# Patient Record
Sex: MALE | Race: WHITE | NOT HISPANIC OR LATINO | Employment: FULL TIME | URBAN - METROPOLITAN AREA
[De-identification: names, ages, dates, MRNs, and addresses within clinical notes are randomized per-mention and may not be internally consistent; named-entity substitution may affect disease eponyms.]

---

## 2018-12-07 ENCOUNTER — TELEPHONE (OUTPATIENT)
Dept: NEUROSURGERY | Facility: CLINIC | Age: 58
End: 2018-12-07

## 2018-12-07 ENCOUNTER — OFFICE VISIT (OUTPATIENT)
Dept: NEUROSURGERY | Facility: CLINIC | Age: 58
End: 2018-12-07

## 2018-12-07 DIAGNOSIS — M51.26 HNP (HERNIATED NUCLEUS PULPOSUS), LUMBAR: Primary | ICD-10-CM

## 2018-12-07 DIAGNOSIS — M54.32 SCIATICA OF LEFT SIDE: ICD-10-CM

## 2018-12-07 PROCEDURE — 99202 OFFICE O/P NEW SF 15 MIN: CPT | Performed by: NEUROLOGICAL SURGERY

## 2018-12-07 RX ORDER — METHYLPREDNISOLONE 4 MG/1
TABLET ORAL
Qty: 21 TABLET | Refills: 0 | Status: SHIPPED | OUTPATIENT
Start: 2018-12-07 | End: 2019-01-22

## 2018-12-07 NOTE — TELEPHONE ENCOUNTER
Provider: Rashard   Caller: self  Time of call:     Phone #:  [phone]  Surgery:  n/a  Surgery Date:    Last visit:   12/7/18, today in [place]  Next visit:     MAEVE:         Reason for call:       Patient states that Dr. Wetzel was going to send a steroid pack to his pharmacy and it hasn't been sent.    Please advise

## 2018-12-10 VITALS
WEIGHT: 245 LBS | HEIGHT: 73 IN | BODY MASS INDEX: 32.47 KG/M2 | TEMPERATURE: 94.7 F | OXYGEN SATURATION: 99 % | RESPIRATION RATE: 17 BRPM

## 2018-12-10 RX ORDER — OXYCODONE AND ACETAMINOPHEN 7.5; 325 MG/1; MG/1
1 TABLET ORAL EVERY 6 HOURS PRN
COMMUNITY
End: 2019-01-22

## 2018-12-10 RX ORDER — HYDROCODONE BITARTRATE AND ACETAMINOPHEN 7.5; 325 MG/1; MG/1
1 TABLET ORAL EVERY 6 HOURS PRN
COMMUNITY

## 2018-12-10 RX ORDER — TIZANIDINE 2 MG/1
2 TABLET ORAL NIGHTLY PRN
COMMUNITY

## 2018-12-10 NOTE — PROGRESS NOTES
Chief Complaint:  Low back pain  Left leg pain        PATIENT: Abundio Crooks  :  1960      HISTORY OF PRESENT ILLNESS: I saw Abundio Crooks in neurosurgical consultation. He is a 57-year-old gentleman who presents with a history of back, buttock and anterior thigh pain. The pain has been present for about 3-4 weeks. It is relatively significant and it is associated with tingling and burning. He denies any bowel or bladder incontinence. He has a history of some chronic back aches, but nothing significant. He has no evidence of right leg pain. He denies any cauda equina syndrome. He has not been through physical therapy.    PAST MEDICAL HISTORY: Reviewed and documented in the chart. He is a heavy smoker. He has a history of obesity.     MEDICATIONS: He is on some Percocet     PHYSICAL EXAMINATION: He is awake and alert and follows commands. He does not appear in too much pain. He has decreased range of motion. He is walking with assistance of a cane. He has no signs of intrinsic hip dysfunction. His back is without lesions. He has no straight leg raise sign. He has good strength with trace of reflexes. He has no clonus long tract signs of myelopathy.     I reviewed an MRI. Interestingly, he has a LARGE right-sided L5-S1 disc, but I think the culprit lesion is the left small 2-3 disc herniation.    IMPRESSION/PLAN: My impression is that he presents with relatively early onset of left-sided anterior thigh sciatica. It is from an L2-3 disc herniation. I think it is small. He has not been through physical therapy. I would recommend some steroids, a course of conservative care including physical therapy and a followup in 2019 to track his progress. I explained the risks, benefits and expected outcome of the surgery. I think it is a little bit premature.        Ravi Wetzel MD  CNR/[facility]

## 2018-12-27 ENCOUNTER — TELEPHONE (OUTPATIENT)
Dept: NEUROSURGERY | Facility: CLINIC | Age: 58
End: 2018-12-27

## 2018-12-27 NOTE — TELEPHONE ENCOUNTER
Patient needs to do formal PT prior to consideration for surgery.  No reason for see him back until he tries PT unless he develops signs and symptoms of cauda equina

## 2018-12-27 NOTE — TELEPHONE ENCOUNTER
Provider: Rashard   Caller: female, unkown  Time of call: 359    Phone #: [phone]   Last visit:  12/7/18   Next visit: January, not scheduled yet    Reason for call:         Female called on behalf of pt stating that the pt was trying to schedule a f/u with Dr. Wetzel however the person she spoke with to schedule this stated that he has to have done PT before scheduling this, which he has not done. He was under the impression the PT was only for if he had surgery, which he didn't. His off-work status expires today, they want to know if he is to return to work tomorrow, does he need to perform PT before scheduling f/u or can he proceed with scheduling a f/u visit? She mentioned he did not want to interfere with his FMLA/short-term disability claims but I informed her that that subject is outside my area of knowledge (or concern).

## 2018-12-28 NOTE — TELEPHONE ENCOUNTER
Patient was under the impression that he did not need PT unless he was having surgery. I told him that was correct but Dr. Wetzel wants him to have PT prior to his f/u visit.  I have faxed a PT order to [facility] at [phone]

## 2019-01-18 ENCOUNTER — OFFICE VISIT (OUTPATIENT)
Dept: NEUROSURGERY | Facility: CLINIC | Age: 59
End: 2019-01-18

## 2019-01-18 DIAGNOSIS — M54.32 SCIATICA OF LEFT SIDE: ICD-10-CM

## 2019-01-18 DIAGNOSIS — M51.26 HNP (HERNIATED NUCLEUS PULPOSUS), LUMBAR: Primary | ICD-10-CM

## 2019-01-18 PROCEDURE — 99212 OFFICE O/P EST SF 10 MIN: CPT | Performed by: NEUROLOGICAL SURGERY

## 2019-01-22 VITALS — HEIGHT: 73 IN | TEMPERATURE: 98.8 F | RESPIRATION RATE: 17 BRPM | WEIGHT: 250 LBS | BODY MASS INDEX: 33.13 KG/M2

## 2019-01-22 NOTE — PROGRESS NOTES
"Chief Complaint:  \"Left lower back pain\"          2019  PATIENT: Abundio Crooks  :  1960      HISTORY OF PRESENT ILLNESS: I saw Abundio Crooks in neurosurgical consultation. He is a gentleman well-known to me for a history of some left thigh tingling issues who had a ruptured L5-S1 disc. It was mostly eccentric to the right. He had a lot of back pain. He denied bowel or bladder problems.    He is better with physical therapy. His exam today is good. He is ambulating well. He has good strength. I detected no weakness.     My plan will be for monitoring his neurologic exam. We will make a referral to interventional pain specialist if he continues to have discomfort in his back.        Ravi Wetzel MD  "

## 2021-04-16 ENCOUNTER — OFFICE VISIT (OUTPATIENT)
Dept: CARDIOLOGY | Facility: CLINIC | Age: 61
End: 2021-04-16

## 2021-04-16 VITALS
SYSTOLIC BLOOD PRESSURE: 136 MMHG | RESPIRATION RATE: 12 BRPM | OXYGEN SATURATION: 99 % | BODY MASS INDEX: 32.18 KG/M2 | HEART RATE: 84 BPM | TEMPERATURE: 97.8 F | HEIGHT: 72 IN | DIASTOLIC BLOOD PRESSURE: 88 MMHG | WEIGHT: 237.6 LBS

## 2021-04-16 DIAGNOSIS — Z72.0 TOBACCO USE: ICD-10-CM

## 2021-04-16 DIAGNOSIS — I10 ESSENTIAL HYPERTENSION: Primary | ICD-10-CM

## 2021-04-16 DIAGNOSIS — I25.10 MILD CAD: ICD-10-CM

## 2021-04-16 DIAGNOSIS — E78.5 HYPERLIPIDEMIA LDL GOAL <70: ICD-10-CM

## 2021-04-16 PROBLEM — E11.9 TYPE 2 DIABETES MELLITUS, WITHOUT LONG-TERM CURRENT USE OF INSULIN (HCC): Status: ACTIVE | Noted: 2021-04-16

## 2021-04-16 PROCEDURE — 93000 ELECTROCARDIOGRAM COMPLETE: CPT | Performed by: INTERNAL MEDICINE

## 2021-04-16 PROCEDURE — 99214 OFFICE O/P EST MOD 30 MIN: CPT | Performed by: INTERNAL MEDICINE

## 2021-04-16 RX ORDER — PRAVASTATIN SODIUM 10 MG
10 TABLET ORAL DAILY
COMMUNITY
Start: 2021-02-10

## 2021-04-16 RX ORDER — TIOTROPIUM BROMIDE INHALATION SPRAY 3.12 UG/1
SPRAY, METERED RESPIRATORY (INHALATION)
COMMUNITY
Start: 2021-02-19

## 2021-04-16 RX ORDER — FENOFIBRATE 160 MG/1
TABLET ORAL
COMMUNITY
Start: 2021-02-16 | End: 2023-01-19 | Stop reason: ALTCHOICE

## 2021-04-16 RX ORDER — OMEPRAZOLE 40 MG/1
CAPSULE, DELAYED RELEASE ORAL
COMMUNITY
Start: 2021-04-03

## 2021-04-16 RX ORDER — LOSARTAN POTASSIUM AND HYDROCHLOROTHIAZIDE 12.5; 5 MG/1; MG/1
1 TABLET ORAL DAILY
COMMUNITY
Start: 2021-02-15

## 2021-04-16 NOTE — PROGRESS NOTES
[facility]  [facility] CARDIOLOGY  [address] [place] [address]  Dept: [phone]  Dept Fax: [phone]    Abundio Crooks  1960    Follow Up Office Visit Note    History of Present Illness:  Abundio Crooks is a 60 y.o. male who presents to the clinic for Follow-up. CAD- He denies any chest pain or SOB, has a stress echo in 2019 normal, normal EF. On ASA, he is still smoking and has diabetes    The following portions of the patient's history were reviewed and updated as appropriate: allergies, current medications, past family history, past medical history, past social history, past surgical history and problem list.    Medications:  Accu-Chek Grecia Plus strip  Accu-Chek Softclix Lancets  fenofibrate  HYDROcodone-acetaminophen  icosapent ethyl capsule  losartan-hydrochlorothiazide  metFORMIN  methylPREDNISolone  metroNIDAZOLE  omeprazole  pravastatin  Spiriva Respimat aerosol solution  tiZANidine    Subjective  No Known Allergies     Past Medical History:   Diagnosis Date   • Arthritis    • BMI 34.0-34.9,adult    • COPD (chronic obstructive pulmonary disease) (CMS/HCC)    • Coronary artery disease involving native coronary artery with other form of angina pectoris, unspecified whether native or transplanted heart (CMS/HCC)    • Diabetes mellitus due to underlying condition with hyperglycemia (CMS/HCC)    • Elevated lipids    • Hearing loss    • Heart attack (CMS/HCC)    • Hyperlipidemia    • Hypertension    • Tobacco use        History reviewed. No pertinent surgical history.    Family History   Problem Relation Age of Onset   • Cancer Father         Cancer of Thyroid   • Cancer Sister         Breast Cancer        Social History     Socioeconomic History   • Marital status: Single     Spouse name: Not on file   • Number of children: Not on file   • Years of education: Not on file   • Highest education level: Not on file   Tobacco Use   • Smoking status: Current Every  "Day Smoker   • Smokeless tobacco: Never Used   Substance and Sexual Activity   • Alcohol use: Yes   • Drug use: No   • Sexual activity: Defer       Review of Systems   Constitutional: Negative.    HENT: Negative.    Respiratory: Negative.    Cardiovascular: Negative.    Endocrine: Negative.    Genitourinary: Negative.    Musculoskeletal: Negative.    Skin: Negative.    Allergic/Immunologic: Negative.    Neurological: Negative.    Hematological: Negative.    Psychiatric/Behavioral: Negative.    All other systems reviewed and are negative.      Cardiovascular Procedures    ECHO/MUGA:   STRESS TESTS:   CARDIAC CATH:   DEVICES:   HOLTER:   CT/MRI:   VASCULAR:   CARDIOTHORACIC:     Objective  Vitals:    04/16/21 1057   BP: 136/88   BP Location: Right arm   Patient Position: Sitting   Cuff Size: Adult   Pulse: 84   Resp: 12   Temp: 97.8 °F (36.6 °C)   TempSrc: Infrared   SpO2: 99%   Weight: 108 kg (237 lb 9.6 oz)   Height: 182.9 cm (72\")   PainSc: 0-No pain     Body mass index is 32.22 kg/m².     Physical Exam  Constitutional:       Appearance: Healthy appearance. Not in distress.   Neck:      Vascular: No JVR. JVD normal.   Pulmonary:      Effort: Pulmonary effort is normal.      Breath sounds: Normal breath sounds. No wheezing. No rhonchi. No rales.   Chest:      Chest wall: Not tender to palpatation.   Cardiovascular:      PMI at left midclavicular line. Normal rate. Regular rhythm. Normal S1. Normal S2.      Murmurs: There is no murmur.      No gallop. No click. No rub.   Pulses:     Intact distal pulses.   Edema:     Peripheral edema absent.   Abdominal:      General: Bowel sounds are normal.      Palpations: Abdomen is soft.      Tenderness: There is no abdominal tenderness.   Musculoskeletal: Normal range of motion.         General: No tenderness. Skin:     General: Skin is warm and dry.   Neurological:      General: No focal deficit present.      Mental Status: Alert and oriented to person, place and time.      "     Diagnostic Data    ECG 12 Lead    Date/Time: 4/16/2021 11:31 AM  Performed by: Steve Ventura MD  Authorized by: Steve Ventura MD   Comparison: compared with previous ECG   Similar to previous ECG  Rhythm: sinus rhythm  Rate: normal  BPM: 59  Conduction: left anterior fascicular block    Clinical impression: normal ECG            Assessment and Plan  Diagnoses and all orders for this visit:    Essential hypertension-The BP is fine 120.70, will keep Losartan 50.12.5     Hyperlipidemia LDL goal <70- On Pravastatin and also Vascepa and fenofibrate, will get a Lipid     Mild CAD_ He has had a cardiac cath in [place], no records with mild disease/  Diabetes -   Tobacco abuser- Heavy 2 packs daily, does not want to quit     Other orders  -     pravastatin (PRAVACHOL) 10 MG tablet; Take 10 mg by mouth Daily.  -     omeprazole (priLOSEC) 40 MG capsule; TAKE 1 CAPSULE BY MOUTH ONCE DAILY FOR 30 DAYS  -     fenofibrate 160 MG tablet; TAKE 1 TABLET BY MOUTH ONCE DAILY WITH FOOD FOR 30 DAYS  -     losartan-hydrochlorothiazide (HYZAAR) 50-12.5 MG per tablet; Take 1 tablet by mouth Daily.  -     Spiriva Respimat 2.5 MCG/ACT aerosol solution inhaler         No follow-ups on file.    Steve Ventura MD  04/16/2021

## 2021-04-17 LAB
ALBUMIN SERPL-MCNC: 4.4 G/DL (ref 3.8–4.9)
ALBUMIN/GLOB SERPL: 1.7 {RATIO} (ref 1.2–2.2)
ALP SERPL-CCNC: 48 IU/L (ref 39–117)
ALT SERPL-CCNC: 22 IU/L (ref 0–44)
AST SERPL-CCNC: 17 IU/L (ref 0–40)
BASOPHILS # BLD AUTO: 0.1 X10E3/UL (ref 0–0.2)
BASOPHILS NFR BLD AUTO: 1 %
BILIRUB SERPL-MCNC: 0.3 MG/DL (ref 0–1.2)
BUN SERPL-MCNC: 17 MG/DL (ref 6–24)
BUN/CREAT SERPL: 20 (ref 9–20)
CALCIUM SERPL-MCNC: 9.2 MG/DL (ref 8.7–10.2)
CHLORIDE SERPL-SCNC: 101 MMOL/L (ref 96–106)
CHOLEST SERPL-MCNC: 126 MG/DL (ref 100–199)
CK SERPL-CCNC: 83 U/L (ref 41–331)
CO2 SERPL-SCNC: 22 MMOL/L (ref 20–29)
CREAT SERPL-MCNC: 0.85 MG/DL (ref 0.76–1.27)
EOSINOPHIL # BLD AUTO: 0.2 X10E3/UL (ref 0–0.4)
EOSINOPHIL NFR BLD AUTO: 2 %
ERYTHROCYTE [DISTWIDTH] IN BLOOD BY AUTOMATED COUNT: 13.2 % (ref 11.6–15.4)
GLOBULIN SER CALC-MCNC: 2.6 G/DL (ref 1.5–4.5)
GLUCOSE SERPL-MCNC: 120 MG/DL (ref 65–99)
HCT VFR BLD AUTO: 44.3 % (ref 37.5–51)
HDLC SERPL-MCNC: 23 MG/DL
HGB BLD-MCNC: 14.8 G/DL (ref 13–17.7)
IMM GRANULOCYTES # BLD AUTO: 0 X10E3/UL (ref 0–0.1)
IMM GRANULOCYTES NFR BLD AUTO: 0 %
LDLC SERPL CALC-MCNC: 57 MG/DL (ref 0–99)
LYMPHOCYTES # BLD AUTO: 3.3 X10E3/UL (ref 0.7–3.1)
LYMPHOCYTES NFR BLD AUTO: 34 %
MCH RBC QN AUTO: 29.1 PG (ref 26.6–33)
MCHC RBC AUTO-ENTMCNC: 33.4 G/DL (ref 31.5–35.7)
MCV RBC AUTO: 87 FL (ref 79–97)
MONOCYTES # BLD AUTO: 0.6 X10E3/UL (ref 0.1–0.9)
MONOCYTES NFR BLD AUTO: 6 %
NEUTROPHILS # BLD AUTO: 5.6 X10E3/UL (ref 1.4–7)
NEUTROPHILS NFR BLD AUTO: 57 %
PLATELET # BLD AUTO: 283 X10E3/UL (ref 150–450)
POTASSIUM SERPL-SCNC: 4.4 MMOL/L (ref 3.5–5.2)
PROT SERPL-MCNC: 7 G/DL (ref 6–8.5)
RBC # BLD AUTO: 5.09 X10E6/UL (ref 4.14–5.8)
SODIUM SERPL-SCNC: 139 MMOL/L (ref 134–144)
TRIGL SERPL-MCNC: 291 MG/DL (ref 0–149)
VLDLC SERPL CALC-MCNC: 46 MG/DL (ref 5–40)
WBC # BLD AUTO: 9.8 X10E3/UL (ref 3.4–10.8)

## 2021-04-19 ENCOUNTER — TELEPHONE (OUTPATIENT)
Dept: CARDIOLOGY | Facility: CLINIC | Age: 61
End: 2021-04-19

## 2021-04-19 NOTE — TELEPHONE ENCOUNTER
Cholesterol is good but Try are elevated, depsite fenofibrate and also Vascepa., please slow down your smoking,  . Pt contacted

## 2021-11-08 RX ORDER — ICOSAPENT ETHYL 1000 MG/1
CAPSULE ORAL
Qty: 120 CAPSULE | Refills: 0 | Status: SHIPPED | OUTPATIENT
Start: 2021-11-08 | End: 2021-12-13

## 2021-11-10 ENCOUNTER — TELEPHONE (OUTPATIENT)
Dept: CARDIOLOGY | Facility: CLINIC | Age: 61
End: 2021-11-10

## 2021-12-13 RX ORDER — ICOSAPENT ETHYL 1000 MG/1
CAPSULE ORAL
Qty: 120 CAPSULE | Refills: 0 | Status: SHIPPED | OUTPATIENT
Start: 2021-12-13 | End: 2022-01-21 | Stop reason: SDUPTHER

## 2022-01-13 RX ORDER — ICOSAPENT ETHYL 1000 MG/1
CAPSULE ORAL
Qty: 120 CAPSULE | Refills: 0 | OUTPATIENT
Start: 2022-01-13

## 2022-01-18 ENCOUNTER — TELEPHONE (OUTPATIENT)
Dept: CARDIOLOGY | Facility: CLINIC | Age: 62
End: 2022-01-18

## 2022-01-19 ENCOUNTER — OFFICE VISIT (OUTPATIENT)
Dept: CARDIOLOGY | Facility: CLINIC | Age: 62
End: 2022-01-19

## 2022-01-19 VITALS
BODY MASS INDEX: 30.29 KG/M2 | SYSTOLIC BLOOD PRESSURE: 128 MMHG | WEIGHT: 236 LBS | HEART RATE: 71 BPM | TEMPERATURE: 97 F | HEIGHT: 74 IN | DIASTOLIC BLOOD PRESSURE: 74 MMHG | OXYGEN SATURATION: 99 % | RESPIRATION RATE: 11 BRPM

## 2022-01-19 DIAGNOSIS — I45.2 BIFASCICULAR BLOCK: ICD-10-CM

## 2022-01-19 DIAGNOSIS — I25.10 MILD CAD: ICD-10-CM

## 2022-01-19 DIAGNOSIS — E78.5 HYPERLIPIDEMIA LDL GOAL <70: ICD-10-CM

## 2022-01-19 DIAGNOSIS — I10 ESSENTIAL HYPERTENSION: Primary | ICD-10-CM

## 2022-01-19 DIAGNOSIS — Z72.0 TOBACCO USE: ICD-10-CM

## 2022-01-19 DIAGNOSIS — E11.9 TYPE 2 DIABETES MELLITUS WITHOUT COMPLICATION, WITHOUT LONG-TERM CURRENT USE OF INSULIN: ICD-10-CM

## 2022-01-19 PROCEDURE — 99214 OFFICE O/P EST MOD 30 MIN: CPT | Performed by: INTERNAL MEDICINE

## 2022-01-19 PROCEDURE — 93000 ELECTROCARDIOGRAM COMPLETE: CPT | Performed by: INTERNAL MEDICINE

## 2022-01-19 NOTE — PROGRESS NOTES
[facility]  [facility] CARDIOLOGY  [address] [place] [address]  Dept: [phone]  Dept Fax: [phone]    Abundio Crooks  1960    Follow Up Office Visit Note    History of Present Illness:  Abundio Crooks is a 61 y.o. male who presents to the clinic for  CAD- Mild disease by prior cath at [place] , no records, denies any complaints on ASA    The following portions of the patient's history were reviewed and updated as appropriate: allergies, current medications, past family history, past medical history, past social history, past surgical history and problem list.    Medications:  Accu-Chek Grecia Plus strip  Accu-Chek Softclix Lancets  fenofibrate  HYDROcodone-acetaminophen  icosapent ethyl capsule  losartan-hydrochlorothiazide  metFORMIN  methylPREDNISolone  metroNIDAZOLE  omeprazole  pravastatin  Spiriva Respimat aerosol solution  tiZANidine    Subjective  No Known Allergies     Past Medical History:   Diagnosis Date   • Arthritis    • BMI 34.0-34.9,adult    • COPD (chronic obstructive pulmonary disease) (HCC)    • Coronary artery disease involving native coronary artery with other form of angina pectoris, unspecified whether native or transplanted heart (HCC)    • Diabetes mellitus due to underlying condition with hyperglycemia (HCC)    • Elevated lipids    • Hearing loss    • Heart attack (HCC)    • Hyperlipidemia    • Hypertension    • Tobacco use        History reviewed. No pertinent surgical history.    Family History   Problem Relation Age of Onset   • Cancer Father         Cancer of Thyroid   • Cancer Sister         Breast Cancer        Social History     Socioeconomic History   • Marital status: Single   Tobacco Use   • Smoking status: Current Every Day Smoker     Packs/day: 2.00     Types: Cigarettes   • Smokeless tobacco: Never Used   Vaping Use   • Vaping Use: Never used   Substance and Sexual Activity   • Alcohol use: Yes   • Drug use: No   •  "Sexual activity: Defer       Review of Systems   Constitutional: Negative.    HENT: Negative.    Respiratory: Positive for shortness of breath.    Cardiovascular: Negative.    Endocrine: Negative.    Genitourinary: Negative.    Musculoskeletal: Negative.    Skin: Negative.    Allergic/Immunologic: Negative.    Neurological: Negative.    Hematological: Negative.    Psychiatric/Behavioral: Negative.    All other systems reviewed and are negative.      Cardiovascular Procedures    ECHO/MUGA:   STRESS TESTS:   CARDIAC CATH:   DEVICES:   HOLTER:   CT/MRI:   VASCULAR:   CARDIOTHORACIC:     Objective  Vitals:    01/19/22 0859   BP: 128/74   BP Location: Left arm   Patient Position: Lying   Cuff Size: Adult   Pulse: 71   Resp: 11   Temp: 97 °F (36.1 °C)   TempSrc: Infrared   SpO2: 99%   Weight: 107 kg (236 lb)   Height: 188 cm (74\")   PainSc: 0-No pain     Body mass index is 30.3 kg/m².     Physical Exam  Constitutional:       Appearance: Healthy appearance. Not in distress.   Neck:      Vascular: No JVR. JVD normal.   Pulmonary:      Effort: Pulmonary effort is normal.      Breath sounds: Normal breath sounds. No wheezing. No rhonchi. No rales.   Chest:      Chest wall: Not tender to palpatation.   Cardiovascular:      PMI at left midclavicular line. Normal rate. Regular rhythm. Normal S1. Normal S2.      Murmurs: There is no murmur.      No gallop. No click. No rub.   Pulses:     Intact distal pulses.   Edema:     Peripheral edema absent.   Abdominal:      General: Bowel sounds are normal.      Palpations: Abdomen is soft.      Tenderness: There is no abdominal tenderness.   Musculoskeletal: Normal range of motion.         General: No tenderness. Skin:     General: Skin is warm and dry.   Neurological:      General: No focal deficit present.      Mental Status: Alert and oriented to person, place and time.          Diagnostic Data    ECG 12 Lead    Date/Time: 1/19/2022 9:45 AM  Performed by: Steve Ventura, " MD  Authorized by: Steve Ventura MD   Comparison: compared with previous ECG from 4/21/2021  Comparison to previous ECG: New RBBB  Rhythm: sinus rhythm and sinus bradycardia  Rate: bradycardic  BPM: 54  Conduction: right bundle branch block and left anterior fascicular block  QRS axis: left              Assessment and Plan  Diagnoses and all orders for this visit:    Essential hypertension- BP is fine 120.80 - On Losartan 50.12.5     Hyperlipidemia LDL goal <70- on Pravastatin,Vascepa and fenofibrate , will get Lipid profile    Mild CAD- Mild disease    Tobacco use- Still smoking     Type 2 diabetes mellitus without complication, without long-term current use of insulin (HCC)  Bifascicular block- New RBBB and old LASH         Return in about 1 year (around 1/19/2023) for Recheck.    Steve Ventura MD  01/19/2022

## 2022-01-20 ENCOUNTER — TELEPHONE (OUTPATIENT)
Dept: CARDIOLOGY | Facility: CLINIC | Age: 62
End: 2022-01-20

## 2022-01-20 LAB
ALBUMIN SERPL-MCNC: 4.6 G/DL (ref 3.8–4.9)
ALBUMIN/GLOB SERPL: 2.1 {RATIO} (ref 1.2–2.2)
ALP SERPL-CCNC: 45 IU/L (ref 44–121)
ALT SERPL-CCNC: 18 IU/L (ref 0–44)
AST SERPL-CCNC: 17 IU/L (ref 0–40)
BASOPHILS # BLD AUTO: 0.1 X10E3/UL (ref 0–0.2)
BASOPHILS NFR BLD AUTO: 0 %
BILIRUB SERPL-MCNC: 0.2 MG/DL (ref 0–1.2)
BUN SERPL-MCNC: 20 MG/DL (ref 6–24)
BUN/CREAT SERPL: 22 (ref 9–20)
CALCIUM SERPL-MCNC: 9.1 MG/DL (ref 8.7–10.2)
CHLORIDE SERPL-SCNC: 103 MMOL/L (ref 96–106)
CHOLEST SERPL-MCNC: 137 MG/DL (ref 100–199)
CO2 SERPL-SCNC: 22 MMOL/L (ref 20–29)
CREAT SERPL-MCNC: 0.91 MG/DL (ref 0.76–1.27)
EOSINOPHIL # BLD AUTO: 0.2 X10E3/UL (ref 0–0.4)
EOSINOPHIL NFR BLD AUTO: 2 %
ERYTHROCYTE [DISTWIDTH] IN BLOOD BY AUTOMATED COUNT: 12.9 % (ref 11.6–15.4)
GLOBULIN SER CALC-MCNC: 2.2 G/DL (ref 1.5–4.5)
GLUCOSE SERPL-MCNC: 113 MG/DL (ref 65–99)
HCT VFR BLD AUTO: 35.4 % (ref 37.5–51)
HDLC SERPL-MCNC: 32 MG/DL
HGB BLD-MCNC: 11.5 G/DL (ref 13–17.7)
IMM GRANULOCYTES # BLD AUTO: 0 X10E3/UL (ref 0–0.1)
IMM GRANULOCYTES NFR BLD AUTO: 0 %
LDLC SERPL CALC-MCNC: 80 MG/DL (ref 0–99)
LYMPHOCYTES # BLD AUTO: 3.3 X10E3/UL (ref 0.7–3.1)
LYMPHOCYTES NFR BLD AUTO: 28 %
MCH RBC QN AUTO: 26.9 PG (ref 26.6–33)
MCHC RBC AUTO-ENTMCNC: 32.5 G/DL (ref 31.5–35.7)
MCV RBC AUTO: 83 FL (ref 79–97)
MONOCYTES # BLD AUTO: 0.7 X10E3/UL (ref 0.1–0.9)
MONOCYTES NFR BLD AUTO: 6 %
NEUTROPHILS # BLD AUTO: 7.4 X10E3/UL (ref 1.4–7)
NEUTROPHILS NFR BLD AUTO: 64 %
PLATELET # BLD AUTO: 359 X10E3/UL (ref 150–450)
POTASSIUM SERPL-SCNC: 4.2 MMOL/L (ref 3.5–5.2)
PROT SERPL-MCNC: 6.8 G/DL (ref 6–8.5)
RBC # BLD AUTO: 4.28 X10E6/UL (ref 4.14–5.8)
SODIUM SERPL-SCNC: 140 MMOL/L (ref 134–144)
TRIGL SERPL-MCNC: 144 MG/DL (ref 0–149)
VLDLC SERPL CALC-MCNC: 25 MG/DL (ref 5–40)
WBC # BLD AUTO: 11.6 X10E3/UL (ref 3.4–10.8)

## 2022-01-20 NOTE — TELEPHONE ENCOUNTER
Pt notified of his recent lab work and advised he is anemic and since this is a new onset he should be seen by his primary MD. Pt denies any blood in urine or his stool.   I advised I have faxed lab results to [facility] and pt verbalized understanding and did not have any questions or concerns at this time..

## 2022-01-21 RX ORDER — ICOSAPENT ETHYL 1000 MG/1
1 CAPSULE ORAL 2 TIMES DAILY WITH MEALS
Qty: 180 CAPSULE | Refills: 3 | Status: SHIPPED | OUTPATIENT
Start: 2022-01-21 | End: 2022-12-19

## 2022-02-15 ENCOUNTER — TELEPHONE (OUTPATIENT)
Dept: CARDIOLOGY | Facility: CLINIC | Age: 62
End: 2022-02-15

## 2022-02-15 NOTE — TELEPHONE ENCOUNTER
Called pt and let him know 1 year supply was called in, his wife stated that the pharmacy told them they couldn't fill the other half until 3/30 I advised her that she would have to contact the pharmacy.

## 2022-02-15 NOTE — TELEPHONE ENCOUNTER
PLEASE CALL PATIENT AS TO WHY THE icosapent ethyl (VASCEPA) 1 g capsule capsule      IS NOT BEING FILLED      PLEASE CALL HIM    [phone]

## 2022-12-19 RX ORDER — ICOSAPENT ETHYL 1000 MG/1
CAPSULE ORAL
Qty: 180 CAPSULE | Refills: 1 | Status: SHIPPED | OUTPATIENT
Start: 2022-12-19

## 2022-12-20 ENCOUNTER — TELEPHONE (OUTPATIENT)
Dept: CARDIOLOGY | Facility: CLINIC | Age: 62
End: 2022-12-20

## 2023-01-19 ENCOUNTER — OFFICE VISIT (OUTPATIENT)
Dept: CARDIOLOGY | Facility: CLINIC | Age: 63
End: 2023-01-19
Payer: COMMERCIAL

## 2023-01-19 VITALS
RESPIRATION RATE: 16 BRPM | BODY MASS INDEX: 31.32 KG/M2 | SYSTOLIC BLOOD PRESSURE: 138 MMHG | DIASTOLIC BLOOD PRESSURE: 84 MMHG | HEART RATE: 80 BPM | OXYGEN SATURATION: 94 % | HEIGHT: 74 IN | TEMPERATURE: 97 F | WEIGHT: 244 LBS

## 2023-01-19 DIAGNOSIS — E11.9 TYPE 2 DIABETES MELLITUS WITHOUT COMPLICATION, WITHOUT LONG-TERM CURRENT USE OF INSULIN: ICD-10-CM

## 2023-01-19 DIAGNOSIS — E78.5 HYPERLIPIDEMIA LDL GOAL <70: ICD-10-CM

## 2023-01-19 DIAGNOSIS — Z72.0 TOBACCO USE: ICD-10-CM

## 2023-01-19 DIAGNOSIS — I45.2 BIFASCICULAR BLOCK: Primary | ICD-10-CM

## 2023-01-19 DIAGNOSIS — I10 ESSENTIAL HYPERTENSION: ICD-10-CM

## 2023-01-19 DIAGNOSIS — I25.10 MILD CAD: ICD-10-CM

## 2023-01-19 PROCEDURE — 93000 ELECTROCARDIOGRAM COMPLETE: CPT | Performed by: INTERNAL MEDICINE

## 2023-01-19 PROCEDURE — 99214 OFFICE O/P EST MOD 30 MIN: CPT | Performed by: INTERNAL MEDICINE

## 2023-01-19 RX ORDER — NORTRIPTYLINE HYDROCHLORIDE 10 MG/1
CAPSULE ORAL
COMMUNITY
Start: 2022-11-30

## 2023-01-19 RX ORDER — NYSTATIN 100000 U/G
OINTMENT TOPICAL
COMMUNITY
Start: 2022-12-22

## 2023-01-19 RX ORDER — LOSARTAN POTASSIUM 50 MG/1
50 TABLET ORAL DAILY
COMMUNITY

## 2023-01-19 RX ORDER — EMPAGLIFLOZIN 10 MG/1
10 TABLET, FILM COATED ORAL DAILY
COMMUNITY
Start: 2022-11-22

## 2023-01-19 RX ORDER — AZITHROMYCIN 250 MG/1
TABLET, FILM COATED ORAL
COMMUNITY
Start: 2023-01-14 | End: 2023-01-19 | Stop reason: ALTCHOICE

## 2023-01-19 RX ORDER — ASPIRIN 81 MG/1
81 TABLET ORAL DAILY
COMMUNITY

## 2023-01-19 RX ORDER — APREMILAST 30 MG/1
TABLET, FILM COATED ORAL
COMMUNITY
Start: 2023-01-02

## 2023-01-19 RX ORDER — IRON POLYSACCHARIDE COMPLEX 150 MG
150 CAPSULE ORAL DAILY
COMMUNITY
Start: 2022-12-26

## 2023-01-19 NOTE — PROGRESS NOTES
[facility]  [facility] CARDIOLOGY  [address] [place] [address]  Dept: [phone]  Dept Fax: [phone]    Abundio Crooks  1960    Follow Up Office Visit Note    History of Present Illness:  Abundio Crooks is a 62 y.o. male who presents to the clinic for Follow-up and Hypertension. BP is good 120.70 on Losartan 50 mg but also takes Losartan HCTZ 50.12.5. will keep same meds    The following portions of the patient's history were reviewed and updated as appropriate: allergies, current medications, past family history, past medical history, past social history, past surgical history, and problem list.    Medications:  Accu-Chek Grecia Plus strip  Accu-Chek Softclix Lancets  aspirin  Ferrex 150 capsule  HYDROcodone-acetaminophen  icosapent ethyl capsule  Jardiance tablet  losartan  losartan-hydrochlorothiazide  methylPREDNISolone  nortriptyline  nystatin  omeprazole  Otezla tablet  pravastatin  Spiriva Respimat aerosol solution  tiZANidine    Subjective  No Known Allergies     Past Medical History:   Diagnosis Date   • Arthritis    • BMI 34.0-34.9,adult    • COPD (chronic obstructive pulmonary disease) ([facility])    • Coronary artery disease involving native coronary artery with other form of angina pectoris, unspecified whether native or transplanted heart ([facility])    • Diabetes mellitus due to underlying condition with hyperglycemia ([facility])    • Elevated lipids    • Hearing loss    • Heart attack (HCC)    • Hyperlipidemia    • Hypertension    • Tobacco use        History reviewed. No pertinent surgical history.    Family History   Problem Relation Age of Onset   • Cancer Father         Cancer of Thyroid   • Cancer Sister         Breast Cancer        Social History     Socioeconomic History   • Marital status: Single   Tobacco Use   • Smoking status: Every Day     Packs/day: 2.00     Types: Cigarettes   • Smokeless tobacco: Never   Vaping Use   • Vaping Use: Never used  "  Substance and Sexual Activity   • Alcohol use: Yes   • Drug use: No   • Sexual activity: Defer       Review of Systems   Constitutional: Negative.    HENT: Negative.    Respiratory: Negative.    Cardiovascular: Negative.    Endocrine: Negative.    Genitourinary: Negative.    Musculoskeletal: Negative.    Skin: Negative.    Allergic/Immunologic: Negative.    Neurological: Negative.    Hematological: Negative.    Psychiatric/Behavioral: Negative.        Cardiovascular Procedures    ECHO/MUGA:  STRESS TESTS:   CARDIAC CATH:   DEVICES:   HOLTER:   CT/MRI:   VASCULAR:   CARDIOTHORACIC:     Objective  Vitals:    01/19/23 1026   BP: 138/84   BP Location: Right arm   Patient Position: Lying   Cuff Size: Adult   Pulse: 80   Resp: 16   Temp: 97 °F (36.1 °C)   TempSrc: Infrared   SpO2: 94%   Weight: 111 kg (244 lb)   Height: 188 cm (74\")   PainSc: 0-No pain     Body mass index is 31.33 kg/m².     Physical Exam  Constitutional:       Appearance: Healthy appearance. Not in distress.   Neck:      Vascular: No JVR. JVD normal.   Pulmonary:      Effort: Pulmonary effort is normal.      Breath sounds: Normal breath sounds. No wheezing. No rhonchi. No rales.   Chest:      Chest wall: Not tender to palpatation.   Cardiovascular:      PMI at left midclavicular line. Normal rate. Regular rhythm. Normal S1. Normal S2.      Murmurs: There is no murmur.      No gallop. No click. No rub.   Pulses:     Intact distal pulses.   Edema:     Peripheral edema absent.   Abdominal:      General: Bowel sounds are normal.      Palpations: Abdomen is soft.      Tenderness: There is no abdominal tenderness.   Musculoskeletal: Normal range of motion.         General: No tenderness. Skin:     General: Skin is warm and dry.   Neurological:      General: No focal deficit present.      Mental Status: Alert and oriented to person, place and time.          Diagnostic Data    ECG 12 Lead    Date/Time: 1/19/2023 11:06 AM  Performed by: Steve Ventura, " MD  Authorized by: Steve Ventura MD   Comparison: compared with previous ECG from 1/19/2022  Similar to previous ECG  Rhythm: sinus rhythm  Rate: normal  BPM: 77  Conduction: right bundle branch block and left anterior fascicular block  QRS axis: left    Clinical impression: abnormal EKG            Assessment and Plan  Diagnoses and all orders for this visit:    Bifascicular block- Stable Hr 77, no changes    Essential hypertension- BP is 120.75. will keep Losartan 50 mg, Losartan HCtz 50.12.5   -     CBC & Differential    Hyperlipidemia LDL goal <70- On Pravastatin 10 mg and also Vascepa, he is off fenofibrate, will get Lipid today  -     Comprehensive Metabolic Panel  -     CBC & Differential  -     Lipid Panel  -     CK    Mild CAD- by cath, no chest pain    Tobacco use- He has quit smoking    Type 2 diabetes mellitus without complication, without long-term current use of insulin ([facility])  -     Hemoglobin A1c    Other orders  -     Otezla 30 MG tablet  -     Discontinue: azithromycin (ZITHROMAX) 250 MG tablet; TAKE 1 TABLET BY MOUTH ON MONDAY, WEDNESDAY AND FRIDAY. NEED TO SCHEDULE AN APPOINTMENT.  -     Jardiance 10 MG tablet tablet; Take 10 mg by mouth Daily.  -     nortriptyline (PAMELOR) 10 MG capsule; TAKE 1 CAPSULE BY MOUTH ONCE A DAY 1 HOUR BEFORE BEDTIME.  -     nystatin (MYCOSTATIN) 835056 UNIT/GM ointment; APPLY OINTMENT TOPICALLY TWICE DAILY FOR 10 DAYS  -     Ferrex 150 150 MG capsule; Take 150 mg by mouth Daily. with food  -     losartan (COZAAR) 50 MG tablet; Take 50 mg by mouth Daily.  -     aspirin 81 MG EC tablet; Take 81 mg by mouth Daily.         Return in about 6 months (around 7/19/2023) for Recheck with Dr. Ventura.    Steve Ventura MD  01/19/2023

## 2023-01-20 ENCOUNTER — TELEPHONE (OUTPATIENT)
Dept: CARDIOLOGY | Facility: CLINIC | Age: 63
End: 2023-01-20
Payer: COMMERCIAL

## 2023-01-20 LAB
ALBUMIN SERPL-MCNC: 4.2 G/DL (ref 3.8–4.9)
ALBUMIN/GLOB SERPL: 1.4 {RATIO} (ref 1.2–2.2)
ALP SERPL-CCNC: 71 IU/L (ref 44–121)
ALT SERPL-CCNC: 25 IU/L (ref 0–44)
AST SERPL-CCNC: 22 IU/L (ref 0–40)
BASOPHILS # BLD AUTO: 0.1 X10E3/UL (ref 0–0.2)
BASOPHILS NFR BLD AUTO: 1 %
BILIRUB SERPL-MCNC: 0.3 MG/DL (ref 0–1.2)
BUN SERPL-MCNC: 18 MG/DL (ref 8–27)
BUN/CREAT SERPL: 20 (ref 10–24)
CALCIUM SERPL-MCNC: 8.7 MG/DL (ref 8.6–10.2)
CHLORIDE SERPL-SCNC: 101 MMOL/L (ref 96–106)
CHOLEST SERPL-MCNC: 148 MG/DL (ref 100–199)
CK SERPL-CCNC: 119 U/L (ref 41–331)
CO2 SERPL-SCNC: 19 MMOL/L (ref 20–29)
CREAT SERPL-MCNC: 0.9 MG/DL (ref 0.76–1.27)
EGFRCR SERPLBLD CKD-EPI 2021: 98 ML/MIN/1.73
EOSINOPHIL # BLD AUTO: 0.3 X10E3/UL (ref 0–0.4)
EOSINOPHIL NFR BLD AUTO: 3 %
ERYTHROCYTE [DISTWIDTH] IN BLOOD BY AUTOMATED COUNT: 17.3 % (ref 11.6–15.4)
GLOBULIN SER CALC-MCNC: 2.9 G/DL (ref 1.5–4.5)
GLUCOSE SERPL-MCNC: 169 MG/DL (ref 70–99)
HBA1C MFR BLD: 6.9 % (ref 4.8–5.6)
HCT VFR BLD AUTO: 30.5 % (ref 37.5–51)
HDLC SERPL-MCNC: 18 MG/DL
HGB BLD-MCNC: 9.2 G/DL (ref 13–17.7)
IMM GRANULOCYTES # BLD AUTO: 0 X10E3/UL (ref 0–0.1)
IMM GRANULOCYTES NFR BLD AUTO: 0 %
LDLC SERPL CALC-MCNC: ABNORMAL MG/DL (ref 0–99)
LYMPHOCYTES # BLD AUTO: 3.1 X10E3/UL (ref 0.7–3.1)
LYMPHOCYTES NFR BLD AUTO: 31 %
MCH RBC QN AUTO: 19.9 PG (ref 26.6–33)
MCHC RBC AUTO-ENTMCNC: 30.2 G/DL (ref 31.5–35.7)
MCV RBC AUTO: 66 FL (ref 79–97)
MONOCYTES # BLD AUTO: 0.6 X10E3/UL (ref 0.1–0.9)
MONOCYTES NFR BLD AUTO: 6 %
NEUTROPHILS # BLD AUTO: 5.9 X10E3/UL (ref 1.4–7)
NEUTROPHILS NFR BLD AUTO: 59 %
PLATELET # BLD AUTO: 535 X10E3/UL (ref 150–450)
POTASSIUM SERPL-SCNC: 4.3 MMOL/L (ref 3.5–5.2)
PROT SERPL-MCNC: 7.1 G/DL (ref 6–8.5)
RBC # BLD AUTO: 4.63 X10E6/UL (ref 4.14–5.8)
SODIUM SERPL-SCNC: 138 MMOL/L (ref 134–144)
TRIGL SERPL-MCNC: 1135 MG/DL (ref 0–149)
VLDLC SERPL CALC-MCNC: ABNORMAL MG/DL
WBC # BLD AUTO: 10 X10E3/UL (ref 3.4–10.8)

## 2023-01-20 NOTE — TELEPHONE ENCOUNTER
Attempted to call Ana Daksha, female answered. Just requested that she have him call us on Monday to go over recent lab work.

## 2023-01-20 NOTE — TELEPHONE ENCOUNTER
----- Message from Steve Ventura MD sent at 1/20/2023  4:49 PM EST -----  He is very anemic HB 9, MCV low, could you add iron studies, ferritin, retic count, Transferrin sat etc . He needs to see PCP, also restart The Fenofibrate 145, the Try are over [id number], ask if he is really taking the vascepa.

## 2023-01-23 LAB
FERRITIN SERPL-MCNC: 6 NG/ML (ref 30–400)
IRON SATN MFR SERPL: 3 % (ref 15–55)
IRON SERPL-MCNC: 14 UG/DL (ref 38–169)
TIBC SERPL-MCNC: 520 UG/DL (ref 250–450)
TRANSFERRIN SERPL-MCNC: 436 MG/DL (ref 177–329)
UIBC SERPL-MCNC: 506 UG/DL (ref 111–343)
WRITTEN AUTHORIZATION: NORMAL

## 2023-01-25 RX ORDER — FENOFIBRATE 145 MG/1
145 TABLET, COATED ORAL DAILY
COMMUNITY
End: 2023-01-25 | Stop reason: SDUPTHER

## 2023-01-25 RX ORDER — FENOFIBRATE 145 MG/1
145 TABLET, COATED ORAL DAILY
Qty: 90 TABLET | Refills: 1 | Status: SHIPPED | OUTPATIENT
Start: 2023-01-25

## 2023-01-25 NOTE — TELEPHONE ENCOUNTER
Left another message that I was calling to go over recent lab work results. Your hemoglobin is low at 9, also your iron levels were low.  Please f/u with your primary MD they may need to find out if you are bleeding from your GI tract.    Your triglycerides are elevated over a 1000, Dr. Ventura would like you to restart the Fenofibrate 145mg.  Also, please call and let us know if you are taking your Vascepa medication.      I am going to send a letter with the lab result and recommendations.

## 2024-01-10 ENCOUNTER — TELEPHONE (OUTPATIENT)
Dept: CARDIOLOGY | Facility: CLINIC | Age: 64
End: 2024-01-10
Payer: COMMERCIAL

## 2024-01-22 ENCOUNTER — OFFICE VISIT (OUTPATIENT)
Dept: CARDIOLOGY | Facility: CLINIC | Age: 64
End: 2024-01-22
Payer: COMMERCIAL

## 2024-01-22 VITALS
TEMPERATURE: 97 F | SYSTOLIC BLOOD PRESSURE: 122 MMHG | BODY MASS INDEX: 38.92 KG/M2 | HEIGHT: 67 IN | OXYGEN SATURATION: 99 % | DIASTOLIC BLOOD PRESSURE: 74 MMHG | HEART RATE: 79 BPM | RESPIRATION RATE: 18 BRPM | WEIGHT: 248 LBS

## 2024-01-22 DIAGNOSIS — E11.9 TYPE 2 DIABETES MELLITUS WITHOUT COMPLICATION, WITHOUT LONG-TERM CURRENT USE OF INSULIN: ICD-10-CM

## 2024-01-22 DIAGNOSIS — E78.5 HYPERLIPIDEMIA LDL GOAL <70: ICD-10-CM

## 2024-01-22 DIAGNOSIS — I45.2 BIFASCICULAR BLOCK: ICD-10-CM

## 2024-01-22 DIAGNOSIS — I10 ESSENTIAL HYPERTENSION: Primary | ICD-10-CM

## 2024-01-22 DIAGNOSIS — I25.10 MILD CAD: ICD-10-CM

## 2024-01-22 PROBLEM — Z72.0 TOBACCO USE: Status: RESOLVED | Noted: 2021-04-16 | Resolved: 2024-01-22

## 2024-01-22 PROCEDURE — 99214 OFFICE O/P EST MOD 30 MIN: CPT | Performed by: INTERNAL MEDICINE

## 2024-01-22 PROCEDURE — 93000 ELECTROCARDIOGRAM COMPLETE: CPT | Performed by: INTERNAL MEDICINE

## 2024-01-22 PROCEDURE — 36415 COLL VENOUS BLD VENIPUNCTURE: CPT | Performed by: INTERNAL MEDICINE

## 2024-01-22 NOTE — PROGRESS NOTES
[facility] [place]  [facility] CARDIOLOGY  [address] [place] [address]  Dept: [phone]  Dept Fax: [phone]    Abundio Crooks  1960    Follow Up Office Visit Note    History of Present Illness:  Abundio Crooks is a 63 y.o. male who presents to the clinic for Follow-up.Hypertension- The BP is  fine 110.60 on Losartan 50.12.5 and Losartan plain, 50 mg more, no complaints,     The following portions of the patient's history were reviewed and updated as appropriate: allergies, current medications, past family history, past medical history, past social history, past surgical history, and problem list.    Medications:  Accu-Chek Grecia Plus strip  Accu-Chek Softclix Lancets  aspirin  fenofibrate  HYDROcodone-acetaminophen  icosapent ethyl capsule  ipratropium-albuterol  Jardiance tablet  losartan  losartan-hydrochlorothiazide  nortriptyline  nystatin  omeprazole  Otezla tablet  pravastatin  tiZANidine    Subjective  No Known Allergies     Past Medical History:   Diagnosis Date   • Arthritis    • BMI 34.0-34.9,adult    • COPD (chronic obstructive pulmonary disease)    • Coronary artery disease involving native coronary artery with other form of angina pectoris, unspecified whether native or transplanted heart    • Diabetes mellitus due to underlying condition with hyperglycemia    • Elevated lipids    • Hearing loss    • Heart attack    • Hyperlipidemia    • Hypertension    • Tobacco use        History reviewed. No pertinent surgical history.    Family History   Problem Relation Age of Onset   • Cancer Father         Cancer of Thyroid   • Cancer Sister         Breast Cancer        Social History     Socioeconomic History   • Marital status: Single   Tobacco Use   • Smoking status: Every Day     Packs/day: 2     Types: Cigarettes   • Smokeless tobacco: Never   Vaping Use   • Vaping Use: Never used   Substance and Sexual Activity   • Alcohol use: Yes   • Drug use: No   •  "Sexual activity: Defer       Review of Systems   Constitutional: Negative.    HENT: Negative.     Respiratory: Negative.     Cardiovascular: Negative.    Endocrine: Negative.    Genitourinary: Negative.    Musculoskeletal: Negative.    Skin: Negative.    Allergic/Immunologic: Negative.    Neurological: Negative.    Hematological: Negative.    Psychiatric/Behavioral: Negative.       Cardiovascular Procedures    ECHO/MUGA:  STRESS TESTS:   CARDIAC CATH:   DEVICES:   HOLTER:   CT/MRI:   VASCULAR:   CARDIOTHORACIC:     Objective  Vitals:    01/22/24 0823   BP: 122/74   BP Location: Right arm   Patient Position: Lying   Cuff Size: Adult   Pulse: 79   Resp: 18   Temp: 97 °F (36.1 °C)   TempSrc: Infrared   SpO2: 99%   Weight: 112 kg (248 lb)   Height: 170.2 cm (67\")   PainSc: 0-No pain     Body mass index is 38.84 kg/m².     Physical Exam  Vitals reviewed.   Constitutional:       Appearance: Healthy appearance. Not in distress.   Neck:      Vascular: No JVR. JVD normal.   Pulmonary:      Effort: Pulmonary effort is normal.      Breath sounds: Normal breath sounds. No wheezing. No rhonchi. No rales.   Chest:      Chest wall: Not tender to palpatation.   Cardiovascular:      PMI at left midclavicular line. Normal rate. Regular rhythm. Normal S1. Normal S2.       Murmurs: There is no murmur.      No gallop.  No click. No rub.   Pulses:     Intact distal pulses.   Edema:     Peripheral edema absent.   Abdominal:      General: Bowel sounds are normal.      Palpations: Abdomen is soft.      Tenderness: There is no abdominal tenderness.   Musculoskeletal: Normal range of motion.         General: No tenderness. Skin:     General: Skin is warm and dry.   Neurological:      General: No focal deficit present.      Mental Status: Alert and oriented to person, place and time.        Diagnostic Data    ECG 12 Lead    Date/Time: 1/22/2024 8:39 AM  Performed by: Steve Ventura MD    Authorized by: Steve Ventura MD  " Comparison: compared with previous ECG from 7/20/2023  Similar to previous ECG  Rhythm: sinus rhythm  Rate: normal  BPM: 63  Conduction: right bundle branch block and left anterior fascicular block  QRS axis: normal    Clinical impression: abnormal EKG        Assessment and Plan  Diagnoses and all orders for this visit:    Essential hypertension--The BP is good, will keep same meds  -     CBC & Differential    Hyperlipidemia LDL goal <70- On Vascepa, pravastatin and Fenofibrate as he has quit smoking tri should be getting better, but also has diabetes  -     Lipid Panel  -     CK    Mild CAD- Mild disease  -     CBC & Differential  -     Comprehensive Metabolic Panel    Bifascicular block- stable HR 63    Type 2 diabetes mellitus without complication, without long-term current use of insulin  -     Hemoglobin A1c         Return in about 6 months (around 7/22/2024) for Recheck with Dr. Ventura.    Steve Ventura MD  01/22/2024

## 2024-01-22 NOTE — PROGRESS NOTES
[facility] [place]  [facility] CARDIOLOGY  [address] [place] [address]  Dept: [phone]  Dept Fax: [phone]    Abundio Crooks  1960    Follow Up Office Visit Note    History of Present Illness:  Abundio Crooks is a 63 y.o. male who presents to the clinic for Follow-up. ***    The following portions of the patient's history were reviewed and updated as appropriate: allergies, current medications, past family history, past medical history, past social history, past surgical history, and problem list.    Medications:  Accu-Chek Grecia Plus strip  Accu-Chek Softclix Lancets  aspirin  fenofibrate  HYDROcodone-acetaminophen  icosapent ethyl capsule  ipratropium-albuterol  Jardiance tablet  losartan  losartan-hydrochlorothiazide  nortriptyline  nystatin  omeprazole  Otezla tablet  pravastatin  tiZANidine    Subjective  No Known Allergies     Past Medical History:   Diagnosis Date   • Arthritis    • BMI 34.0-34.9,adult    • COPD (chronic obstructive pulmonary disease)    • Coronary artery disease involving native coronary artery with other form of angina pectoris, unspecified whether native or transplanted heart    • Diabetes mellitus due to underlying condition with hyperglycemia    • Elevated lipids    • Hearing loss    • Heart attack    • Hyperlipidemia    • Hypertension    • Tobacco use        History reviewed. No pertinent surgical history.    Family History   Problem Relation Age of Onset   • Cancer Father         Cancer of Thyroid   • Cancer Sister         Breast Cancer        Social History     Socioeconomic History   • Marital status: Single   Tobacco Use   • Smoking status: Every Day     Packs/day: 2     Types: Cigarettes   • Smokeless tobacco: Never   Vaping Use   • Vaping Use: Never used   Substance and Sexual Activity   • Alcohol use: Yes   • Drug use: No   • Sexual activity: Defer       Review of Systems   Constitutional: Negative.    HENT: Negative.    "  Respiratory: Negative.     Cardiovascular: Negative.    Endocrine: Negative.    Genitourinary: Negative.    Musculoskeletal: Negative.    Skin: Negative.    Allergic/Immunologic: Negative.    Neurological: Negative.    Hematological: Negative.    Psychiatric/Behavioral: Negative.       Cardiovascular Procedures    ECHO/MUGA:  STRESS TESTS:   CARDIAC CATH:   DEVICES:   HOLTER:   CT/MRI:   VASCULAR:   CARDIOTHORACIC:     Objective  Vitals:    01/22/24 0823   BP: 122/74   BP Location: Right arm   Patient Position: Lying   Cuff Size: Adult   Pulse: 79   Resp: 18   Temp: 97 °F (36.1 °C)   TempSrc: Infrared   SpO2: 99%   Weight: 112 kg (248 lb)   Height: 170.2 cm (67\")   PainSc: 0-No pain     Body mass index is 38.84 kg/m².     Physical Exam  Vitals reviewed.   Constitutional:       Appearance: Healthy appearance. Not in distress.   Neck:      Vascular: No JVR. JVD normal.   Pulmonary:      Effort: Pulmonary effort is normal.      Breath sounds: Normal breath sounds. No wheezing. No rhonchi. No rales.   Chest:      Chest wall: Not tender to palpatation.   Cardiovascular:      PMI at left midclavicular line. Normal rate. Regular rhythm. Normal S1. Normal S2.       Murmurs: There is no murmur.      No gallop.  No click. No rub.   Pulses:     Intact distal pulses.   Edema:     Peripheral edema absent.   Abdominal:      General: Bowel sounds are normal.      Palpations: Abdomen is soft.      Tenderness: There is no abdominal tenderness.   Musculoskeletal: Normal range of motion.         General: No tenderness. Skin:     General: Skin is warm and dry.   Neurological:      General: No focal deficit present.      Mental Status: Alert and oriented to person, place and time.        Diagnostic Data  Procedures    Assessment and Plan  Diagnoses and all orders for this visit:    Essential hypertension  -     CBC & Differential    Hyperlipidemia LDL goal <70  -     Lipid Panel  -     CK    Mild CAD  -     CBC & Differential  -     " Comprehensive Metabolic Panel    Bifascicular block    Type 2 diabetes mellitus without complication, without long-term current use of insulin  -     Hemoglobin A1c         Return in about 6 months (around 7/22/2024) for Recheck with Dr. Ventura.    Steve Ventura MD  01/22/2024  Venipuncture Blood Specimen Collection  Venipuncture performed in clinic by Dixie Keene MA with good hemostasis. Patient tolerated the procedure well without complications.   01/22/24   Dixie Keene MA

## 2024-01-23 LAB
ALBUMIN SERPL-MCNC: 4.5 G/DL (ref 3.9–4.9)
ALBUMIN/GLOB SERPL: 1.8 {RATIO} (ref 1.2–2.2)
ALP SERPL-CCNC: 46 IU/L (ref 44–121)
ALT SERPL-CCNC: 34 IU/L (ref 0–44)
AST SERPL-CCNC: 26 IU/L (ref 0–40)
BASOPHILS # BLD AUTO: 0.1 X10E3/UL (ref 0–0.2)
BASOPHILS NFR BLD AUTO: 1 %
BILIRUB SERPL-MCNC: 0.5 MG/DL (ref 0–1.2)
BUN SERPL-MCNC: 19 MG/DL (ref 8–27)
BUN/CREAT SERPL: 17 (ref 10–24)
CALCIUM SERPL-MCNC: 9.5 MG/DL (ref 8.6–10.2)
CHLORIDE SERPL-SCNC: 101 MMOL/L (ref 96–106)
CHOLEST SERPL-MCNC: 130 MG/DL (ref 100–199)
CK SERPL-CCNC: 154 U/L (ref 41–331)
CO2 SERPL-SCNC: 24 MMOL/L (ref 20–29)
CREAT SERPL-MCNC: 1.11 MG/DL (ref 0.76–1.27)
EGFRCR SERPLBLD CKD-EPI 2021: 76 ML/MIN/1.73
EOSINOPHIL # BLD AUTO: 0.2 X10E3/UL (ref 0–0.4)
EOSINOPHIL NFR BLD AUTO: 2 %
ERYTHROCYTE [DISTWIDTH] IN BLOOD BY AUTOMATED COUNT: 14.1 % (ref 11.6–15.4)
GLOBULIN SER CALC-MCNC: 2.5 G/DL (ref 1.5–4.5)
GLUCOSE SERPL-MCNC: 125 MG/DL (ref 70–99)
HBA1C MFR BLD: 7.4 % (ref 4.8–5.6)
HCT VFR BLD AUTO: 43.3 % (ref 37.5–51)
HDLC SERPL-MCNC: 24 MG/DL
HGB BLD-MCNC: 14.3 G/DL (ref 13–17.7)
IMM GRANULOCYTES # BLD AUTO: 0 X10E3/UL (ref 0–0.1)
IMM GRANULOCYTES NFR BLD AUTO: 0 %
LDLC SERPL CALC-MCNC: 55 MG/DL (ref 0–99)
LYMPHOCYTES # BLD AUTO: 3.4 X10E3/UL (ref 0.7–3.1)
LYMPHOCYTES NFR BLD AUTO: 35 %
MCH RBC QN AUTO: 27.4 PG (ref 26.6–33)
MCHC RBC AUTO-ENTMCNC: 33 G/DL (ref 31.5–35.7)
MCV RBC AUTO: 83 FL (ref 79–97)
MONOCYTES # BLD AUTO: 0.6 X10E3/UL (ref 0.1–0.9)
MONOCYTES NFR BLD AUTO: 6 %
NEUTROPHILS # BLD AUTO: 5.5 X10E3/UL (ref 1.4–7)
NEUTROPHILS NFR BLD AUTO: 56 %
PLATELET # BLD AUTO: 290 X10E3/UL (ref 150–450)
POTASSIUM SERPL-SCNC: 4.3 MMOL/L (ref 3.5–5.2)
PROT SERPL-MCNC: 7 G/DL (ref 6–8.5)
RBC # BLD AUTO: 5.21 X10E6/UL (ref 4.14–5.8)
SODIUM SERPL-SCNC: 141 MMOL/L (ref 134–144)
TRIGL SERPL-MCNC: 325 MG/DL (ref 0–149)
VLDLC SERPL CALC-MCNC: 51 MG/DL (ref 5–40)
WBC # BLD AUTO: 9.8 X10E3/UL (ref 3.4–10.8)

## 2024-01-24 ENCOUNTER — TELEPHONE (OUTPATIENT)
Dept: CARDIOLOGY | Facility: CLINIC | Age: 64
End: 2024-01-24
Payer: COMMERCIAL

## 2024-01-24 NOTE — TELEPHONE ENCOUNTER
----- Message from Steve Ventura MD sent at 1/23/2024  7:27 PM EST -----  Cholesterol is good, Tri are better but still up 325, as you quit smoking are better, but sugar is high,7,4 and this cause the Tri to get high, please discuss with PCP, needs better control

## 2024-01-24 NOTE — TELEPHONE ENCOUNTER
Spoke with  Crooks and went over his recent labs.  Your LDL is good, however the triglycerides remain elevated but much better at 325.  You have quit smoking and that is great and has helped. Your A1C is 7.4 so this could be causing them to stay elevated as well,  so please discuss with TATYANA Virgen to get this under control.  He verbalized understanding.

## 2024-01-29 NOTE — PROGRESS NOTES
Venipuncture Blood Specimen Collection  Venipuncture performed in clinic by Cher Santillan with good hemostasis. Patient tolerated the procedure well without complications.   1/22/24   Cher Santillan

## 2024-07-22 ENCOUNTER — OFFICE VISIT (OUTPATIENT)
Dept: CARDIOLOGY | Facility: CLINIC | Age: 64
End: 2024-07-22
Payer: COMMERCIAL

## 2024-07-22 VITALS
DIASTOLIC BLOOD PRESSURE: 64 MMHG | BODY MASS INDEX: 39.83 KG/M2 | WEIGHT: 253.8 LBS | RESPIRATION RATE: 16 BRPM | HEIGHT: 67 IN | HEART RATE: 61 BPM | SYSTOLIC BLOOD PRESSURE: 130 MMHG | OXYGEN SATURATION: 95 %

## 2024-07-22 DIAGNOSIS — I45.2 BIFASCICULAR BLOCK: ICD-10-CM

## 2024-07-22 DIAGNOSIS — E78.5 HYPERLIPIDEMIA LDL GOAL <70: ICD-10-CM

## 2024-07-22 DIAGNOSIS — I10 ESSENTIAL HYPERTENSION: ICD-10-CM

## 2024-07-22 DIAGNOSIS — I25.10 MILD CAD: Primary | ICD-10-CM

## 2024-07-22 PROCEDURE — 99214 OFFICE O/P EST MOD 30 MIN: CPT | Performed by: INTERNAL MEDICINE

## 2024-07-22 PROCEDURE — 93000 ELECTROCARDIOGRAM COMPLETE: CPT | Performed by: INTERNAL MEDICINE

## 2024-07-22 NOTE — PROGRESS NOTES
[facility] [place]  [facility] CARDIOLOGY  [address] [place] [address]  Dept: [phone]  Dept Fax: [phone]    Abundio Crooks  1960    Follow Up Office Visit Note    History of Present Illness:  Abundio Crooks is a 63 y.o. male who presents to the clinic for Follow-up. CAD- Mild disease no complaints,  EKG sinus no ischemic changes but has bifascicular block, RBBB and LASHB HR 70    The following portions of the patient's history were reviewed and updated as appropriate: allergies, current medications, past family history, past medical history, past social history, past surgical history, and problem list.    Medications:  Accu-Chek Grecia Plus strip  Accu-Chek Softclix Lancets  aspirin  fenofibrate  HYDROcodone-acetaminophen  icosapent ethyl capsule  ipratropium-albuterol  Jardiance tablet  losartan  losartan-hydrochlorothiazide  nortriptyline  nystatin  omeprazole  Otezla tablet  pravastatin  tiZANidine    Subjective  No Known Allergies     Past Medical History:   Diagnosis Date   • Arthritis    • BMI 34.0-34.9,adult    • COPD (chronic obstructive pulmonary disease)    • Coronary artery disease involving native coronary artery with other form of angina pectoris, unspecified whether native or transplanted heart    • Diabetes mellitus due to underlying condition with hyperglycemia    • Elevated lipids    • Hearing loss    • Heart attack    • Hyperlipidemia    • Hypertension    • Tobacco use        History reviewed. No pertinent surgical history.    Family History   Problem Relation Age of Onset   • Cancer Father         Cancer of Thyroid   • Cancer Sister         Breast Cancer        Social History     Socioeconomic History   • Marital status: Single   Tobacco Use   • Smoking status: Every Day     Current packs/day: 2.00     Types: Cigarettes   • Smokeless tobacco: Never   Vaping Use   • Vaping status: Never Used   Substance and Sexual Activity   • Alcohol use: Yes  "  • Drug use: No   • Sexual activity: Defer       Review of Systems   Constitutional: Negative.    HENT: Negative.     Respiratory: Negative.     Cardiovascular: Negative.    Endocrine: Negative.    Genitourinary: Negative.    Musculoskeletal: Negative.    Skin: Negative.    Allergic/Immunologic: Negative.    Neurological: Negative.    Hematological: Negative.    Psychiatric/Behavioral: Negative.       Cardiovascular Procedures    ECHO/MUGA:  STRESS TESTS:   CARDIAC CATH:   DEVICES:   HOLTER:   CT/MRI:   VASCULAR:   CARDIOTHORACIC:     Objective  Vitals:    07/22/24 0822   BP: 130/64   BP Location: Right arm   Patient Position: Sitting   Pulse: 61   Resp: 16   SpO2: 95%   Weight: 115 kg (253 lb 12.8 oz)   Height: 170.2 cm (67\")   PainSc: 0-No pain     Body mass index is 39.75 kg/m².     Physical Exam  Vitals reviewed.   Constitutional:       Appearance: Healthy appearance. Not in distress.   Eyes:      Pupils: Pupils are equal, round, and reactive to light.   HENT:    Mouth/Throat:      Pharynx: Oropharynx is clear.   Neck:      Thyroid: Thyroid normal.      Vascular: No JVR. JVD normal.   Pulmonary:      Effort: Pulmonary effort is normal.      Breath sounds: Normal breath sounds. No wheezing. No rhonchi. No rales.   Chest:      Chest wall: Not tender to palpatation.   Cardiovascular:      PMI at left midclavicular line. Normal rate. Regular rhythm. Normal S1. Normal S2.       Murmurs: There is no murmur.      No gallop.  No click. No rub.   Pulses:     Intact distal pulses.      Carotid: 3+ bilaterally.     Radial: 3+ bilaterally.     Femoral: 3+ bilaterally.     Dorsalis pedis: 3+ bilaterally.     Posterior tibial: 3+ bilaterally.  Edema:     Peripheral edema absent.   Abdominal:      General: Bowel sounds are normal.      Palpations: Abdomen is soft.      Tenderness: There is no abdominal tenderness.   Musculoskeletal: Normal range of motion.         General: No tenderness.      Cervical back: Normal range of " motion and neck supple. Skin:     General: Skin is warm and dry.   Neurological:      General: No focal deficit present.      Mental Status: Alert and oriented to person, place and time.        Diagnostic Data    ECG 12 Lead    Date/Time: 7/22/2024 10:10 AM  Performed by: Steve Ventura MD    Authorized by: Steve Ventura MD  Comparison: compared with previous ECG from 1/22/2024  Similar to previous ECG  Rhythm: sinus rhythm  Rate: normal  BPM: 70  Conduction: right bundle branch block and left anterior fascicular block  QRS axis: left    Clinical impression: abnormal EKG        Assessment and Plan  Diagnoses and all orders for this visit:    Mild CAD- No complaints, no chest pain, mild disease by cath    Essential hypertension- BP is 105.60, on Losartan 50 and also Losartan 50.12.5    Hyperlipidemia LDL goal <70-On Pravastatin, fenofibrate and also Vascepa.    Bifascicular block- RBBB and also LASHB- HR is 70         Return in about 6 months (around 1/22/2025) for Recheck with Dr. Ventura.    Steve Ventura MD  07/22/2024

## 2024-08-14 RX ORDER — PRAVASTATIN SODIUM 40 MG
40 TABLET ORAL DAILY
Qty: 90 TABLET | Refills: 0 | Status: SHIPPED | OUTPATIENT
Start: 2024-08-14

## 2024-09-26 RX ORDER — ICOSAPENT ETHYL 1 G/1
1 CAPSULE ORAL 2 TIMES DAILY WITH MEALS
Qty: 180 CAPSULE | Refills: 0 | Status: SHIPPED | OUTPATIENT
Start: 2024-09-26

## 2024-10-07 RX ORDER — FENOFIBRATE 145 MG/1
145 TABLET, COATED ORAL DAILY
Qty: 90 TABLET | Refills: 1 | Status: SHIPPED | OUTPATIENT
Start: 2024-10-07

## 2024-11-08 RX ORDER — PRAVASTATIN SODIUM 40 MG
40 TABLET ORAL DAILY
Qty: 90 TABLET | Refills: 0 | Status: SHIPPED | OUTPATIENT
Start: 2024-11-08

## 2024-12-16 ENCOUNTER — TELEPHONE (OUTPATIENT)
Dept: CARDIOLOGY | Facility: CLINIC | Age: 64
End: 2024-12-16
Payer: COMMERCIAL

## 2024-12-16 NOTE — TELEPHONE ENCOUNTER
Icosapent 1 gm capsules  Outcome  Approved today by [employer]  Request Reference Number: [id number]. ICOSAPENT CAP 1GM is approved through 12/16/2025. Your patient may now fill this prescription and it will be covered.  Authorization Expiration Date: 12/16/2025

## 2025-01-05 RX ORDER — ICOSAPENT ETHYL 1 G/1
CAPSULE ORAL
Qty: 360 CAPSULE | Refills: 0 | Status: SHIPPED | OUTPATIENT
Start: 2025-01-05

## 2025-01-22 ENCOUNTER — OFFICE VISIT (OUTPATIENT)
Dept: CARDIOLOGY | Facility: CLINIC | Age: 65
End: 2025-01-22
Payer: COMMERCIAL

## 2025-01-22 VITALS
HEIGHT: 67 IN | SYSTOLIC BLOOD PRESSURE: 134 MMHG | WEIGHT: 245 LBS | BODY MASS INDEX: 38.45 KG/M2 | HEART RATE: 82 BPM | RESPIRATION RATE: 18 BRPM | DIASTOLIC BLOOD PRESSURE: 74 MMHG | TEMPERATURE: 97 F | OXYGEN SATURATION: 99 %

## 2025-01-22 DIAGNOSIS — I45.2 BIFASCICULAR BLOCK: ICD-10-CM

## 2025-01-22 DIAGNOSIS — I25.10 MILD CAD: ICD-10-CM

## 2025-01-22 DIAGNOSIS — I10 ESSENTIAL HYPERTENSION: Primary | ICD-10-CM

## 2025-01-22 DIAGNOSIS — E78.5 HYPERLIPIDEMIA LDL GOAL <70: ICD-10-CM

## 2025-01-22 PROCEDURE — 99214 OFFICE O/P EST MOD 30 MIN: CPT | Performed by: INTERNAL MEDICINE

## 2025-01-22 PROCEDURE — 93000 ELECTROCARDIOGRAM COMPLETE: CPT | Performed by: INTERNAL MEDICINE

## 2025-01-22 RX ORDER — ATORVASTATIN CALCIUM 40 MG/1
40 TABLET, FILM COATED ORAL DAILY
Qty: 90 TABLET | Refills: 3 | Status: SHIPPED | OUTPATIENT
Start: 2025-01-22

## 2025-01-22 RX ORDER — AMLODIPINE AND VALSARTAN 5; 320 MG/1; MG/1
1 TABLET ORAL DAILY
Qty: 90 TABLET | Refills: 3 | Status: SHIPPED | OUTPATIENT
Start: 2025-01-22 | End: 2026-01-22

## 2025-01-22 RX ORDER — ICOSAPENT ETHYL 1 G/1
2 CAPSULE ORAL 2 TIMES DAILY WITH MEALS
Qty: 360 CAPSULE | Refills: 3 | Status: SHIPPED | OUTPATIENT
Start: 2025-01-22

## 2025-01-22 NOTE — PROGRESS NOTES
[facility] [place]  [facility] CARDIOLOGY  [address] [place] [address]  Dept: [phone]  Dept Fax: [phone]    Abundio Crooks  1960    Follow Up Office Visit Note    History of Present Illness:  Abundio Crooks is a 64 y.o. male who presents to the clinic for Follow-up.Hypertension- BP is good, but as his Try are elevated, will d.c HCTZ losartan, will use Amlodipine valsartan 5,320 mg once a day, He denies nay chest pain, SOB, palpitations, EKG sinus with bifascicular block, HR is 65    The following portions of the patient's history were reviewed and updated as appropriate: allergies, current medications, past family history, past medical history, past social history, past surgical history, and problem list.    Medications:  Accu-Chek Grecia Plus strip  Accu-Chek Softclix Lancets  amLODIPine-valsartan  aspirin  atorvastatin  fenofibrate  HYDROcodone-acetaminophen  icosapent ethyl capsule  ipratropium-albuterol  Jardiance tablet  nortriptyline  nystatin  omeprazole  tiZANidine    Subjective  No Known Allergies     Past Medical History:   Diagnosis Date   • Arthritis    • BMI 34.0-34.9,adult    • COPD (chronic obstructive pulmonary disease)    • Coronary artery disease involving native coronary artery with other form of angina pectoris, unspecified whether native or transplanted heart    • Diabetes mellitus due to underlying condition with hyperglycemia    • Elevated lipids    • Hearing loss    • Heart attack    • Hyperlipidemia    • Hypertension    • Tobacco use        History reviewed. No pertinent surgical history.    Family History   Problem Relation Age of Onset   • Cancer Father         Cancer of Thyroid   • Cancer Sister         Breast Cancer        Social History     Socioeconomic History   • Marital status: Single   Tobacco Use   • Smoking status: Former     Current packs/day: 0.00     Types: Cigarettes     Quit date: 1970     Years since quittin.0  "  • Smokeless tobacco: Never   Vaping Use   • Vaping status: Never Used   Substance and Sexual Activity   • Alcohol use: Yes   • Drug use: No   • Sexual activity: Defer       Review of Systems   Constitutional: Negative.    HENT: Negative.     Respiratory: Negative.     Cardiovascular: Negative.    Endocrine: Negative.    Genitourinary: Negative.    Musculoskeletal: Negative.    Skin: Negative.    Allergic/Immunologic: Negative.    Neurological: Negative.    Hematological: Negative.    Psychiatric/Behavioral: Negative.       Cardiovascular Procedures    ECHO/MUGA:  STRESS TESTS:   CARDIAC CATH:   DEVICES:   HOLTER:   CT/MRI:   VASCULAR:   CARDIOTHORACIC:     Objective  Vitals:    01/22/25 0823   BP: 134/74   BP Location: Right arm   Patient Position: Sitting   Cuff Size: Adult   Pulse: 82   Resp: 18   Temp: 97 °F (36.1 °C)   TempSrc: Infrared   SpO2: 99%   Weight: 111 kg (245 lb)  Comment: per pt scale being worked on   Height: 170.2 cm (67\")   PainSc: 0-No pain     Body mass index is 38.37 kg/m².     Physical Exam  Vitals reviewed.   Constitutional:       Appearance: Healthy appearance. Not in distress.   Eyes:      Pupils: Pupils are equal, round, and reactive to light.   HENT:    Mouth/Throat:      Pharynx: Oropharynx is clear.   Neck:      Thyroid: Thyroid normal.      Vascular: No JVR. JVD normal.   Pulmonary:      Effort: Pulmonary effort is normal.      Breath sounds: Normal breath sounds. No wheezing. No rhonchi. No rales.   Chest:      Chest wall: Not tender to palpatation.   Cardiovascular:      PMI at left midclavicular line. Normal rate. Regular rhythm. Normal S1. Normal S2.       Murmurs: There is no murmur.      No gallop.  No click. No rub.   Pulses:     Intact distal pulses.      Carotid: 3+ bilaterally.     Radial: 3+ bilaterally.     Femoral: 3+ bilaterally.     Dorsalis pedis: 3+ bilaterally.     Posterior tibial: 3+ bilaterally.  Edema:     Peripheral edema absent.   Abdominal:      General: " Bowel sounds are normal.      Palpations: Abdomen is soft.      Tenderness: There is no abdominal tenderness.   Musculoskeletal: Normal range of motion.         General: No tenderness.      Cervical back: Normal range of motion and neck supple. Skin:     General: Skin is warm and dry.   Neurological:      General: No focal deficit present.      Mental Status: Alert and oriented to person, place and time.        Diagnostic Data    ECG 12 Lead    Date/Time: 1/22/2025 8:59 AM  Performed by: Steve Ventura MD    Authorized by: Steve Ventura MD  Comparison: compared with previous ECG from 7/22/2024  Similar to previous ECG  Rhythm: sinus rhythm  Rate: normal  BPM: 65  Conduction: right bundle branch block and left anterior fascicular block  QRS axis: normal    Clinical impression: abnormal EKG      Assessment and Plan  Diagnoses and all orders for this visit:    Essential hypertension- BP is good will use Exforge 5,320 instead, will d.,c Losartan and Losartan HCTZ    Hyperlipidemia LDL goal <70- Try are over 600, will increase Vascepa to 2 gm bid, will keep Fenofibrate, will change the pravastatin to Atorvastatin to lower the Try further, also was advised to improved diabetes, control     Bifascicular block- RBBB and lASH    Mild CAD- By cath, no chest pain    Other orders  -     icosapent ethyl (VASCEPA) 1 g capsule capsule; Take 2 g by mouth 2 (Two) Times a Day With Meals.  -     atorvastatin (LIPITOR) 40 MG tablet; Take 1 tablet by mouth Daily.  -     amLODIPine-valsartan (Exforge) 5-320 MG per tablet; Take 1 tablet by mouth Daily.         Return in about 6 months (around 7/22/2025) for Recheck with Dr. Ventura.    Steve Ventura MD  01/22/2025

## 2025-02-03 RX ORDER — PRAVASTATIN SODIUM 40 MG
40 TABLET ORAL DAILY
Qty: 90 TABLET | Refills: 0 | OUTPATIENT
Start: 2025-02-03

## 2025-02-11 RX ORDER — FENOFIBRATE 145 MG/1
145 TABLET, COATED ORAL DAILY
Qty: 90 TABLET | Refills: 0 | Status: SHIPPED | OUTPATIENT
Start: 2025-02-11

## 2025-05-07 ENCOUNTER — TELEPHONE (OUTPATIENT)
Dept: CARDIOLOGY | Facility: CLINIC | Age: 65
End: 2025-05-07
Payer: COMMERCIAL

## 2025-05-07 NOTE — TELEPHONE ENCOUNTER
Left a message for  Daksha that Dr Ventura would like him to come in for an EKG prior to clearing him for his colonoscopy/EGD procedure.  Thank you.

## 2025-05-07 NOTE — TELEPHONE ENCOUNTER
----- Message from Steve Ventura sent at 5/7/2025  8:16 AM EDT -----  Regarding: RE: cardiac clearance  Bring him for an EKG he has bifascicular block, otherwise no contraindications  ----- Message -----  From: Lili Gleason RN  Sent: 5/7/2025   7:23 AM EDT  To: Steve Ventura MD  Subject: cardiac clearance                                Scheduled for colonoscopy/egd on 5/14 and needing cardiac clearance.  Only takes aspirin and states no cardiac complaints at this time. History of Present Illness:Abundio Crooks is a 64 y.o. male who presents to the clinic for Follow-up.Hypertension- BP is good, but as his Try are elevated, will emily HCTZ losartan, will use Amlodipine valsartan 5,320 mg once a day, He denies nay chest pain, SOB, palpitations, EKG sinus with bifascicular block, HR is 65Diagnostic Data ECG 12 Lead Date/Time: 1/22/2025 8:59 AMPerformed by: Steve Ventura MD Authorized by: Steve Ventura MD  Comparison: compared with previous ECG from 7/22/2024Similar to previous ECGRhythm: sinus rhythmRate: normalBPM: 65Conduction: right bundle branch block and left anterior fascicular blockQRS axis: normal Clinical impression: abnormal EKG  Assessment and PlanDiagnoses and all orders for this visit: Essential hypertension- BP is good will use Exforge 5,320 instead, will terrell irby Losartan and Losartan HCTZ Hyperlipidemia LDL goal <70- Try are over 600, will increase Vascepa to 2 gm bid, will keep Fenofibrate, will change the pravastatin to Atorvastatin to lower the Try further, also was advised to improved diabetes, control  Bifascicular block- RBBB and lASH Mild CAD- By cath, no chest pain Other orders-     icosapent ethyl (VASCEPA) 1 g capsule capsule; Take 2 g by mouth 2 (Two) Times a Day With Meals.-     atorvastatin (LIPITOR) 40 MG tablet; Take 1 tablet by mouth Daily.-     amLODIPine-valsartan (Exforge) 5-320 MG per tablet; Take 1 tablet by mouth Daily.   Return in about 6 months (around  7/22/2025) for Recheck with Dr. Ventura. Steve Ventura MD01/22/2025

## 2025-05-12 ENCOUNTER — CLINICAL SUPPORT (OUTPATIENT)
Dept: CARDIOLOGY | Facility: CLINIC | Age: 65
End: 2025-05-12
Payer: COMMERCIAL

## 2025-05-12 ENCOUNTER — TELEPHONE (OUTPATIENT)
Dept: CARDIOLOGY | Facility: CLINIC | Age: 65
End: 2025-05-12

## 2025-05-12 VITALS — HEART RATE: 58 BPM | DIASTOLIC BLOOD PRESSURE: 66 MMHG | SYSTOLIC BLOOD PRESSURE: 144 MMHG | OXYGEN SATURATION: 95 %

## 2025-05-12 DIAGNOSIS — Z01.818 PRE-OP EXAM: Primary | ICD-10-CM

## 2025-05-12 PROCEDURE — 93000 ELECTROCARDIOGRAM COMPLETE: CPT | Performed by: INTERNAL MEDICINE

## 2025-05-12 NOTE — TELEPHONE ENCOUNTER
Advised Ana Daksha that Dr Ventura reviewed his EKG and it looks the same.  He is cleared from a cardiac standpoint and can proceed with Colonoscopy and EGD.  Faxed clearance to DR Treviño.

## 2025-05-13 NOTE — ADDENDUM NOTE
Addended by: MISTY INIGUEZ on: 5/12/2025 10:32 PM     Modules accepted: Level of Service     Detail Level: Detailed Lesion Type: Cyst Curette: Yes Include Sutures?: No Anesthesia Type: 1% lidocaine with epinephrine Size Of Lesion In Cm (Optional But May Be Required For Some Insurances): 0 Wound Care: Petrolatum Dressing: dry sterile dressing Epidermal Sutures: 4-0 Ethilon Epidermal Closure: simple interrupted Suture Text: The incision was partially closed with Preparation Text: The area was prepped in the usual clean fashion. Curette Text (Optional): After the contents were expressed a curette was used to partially remove the cyst wall. Consent was obtained and risks were reviewed including but not limited to delayed wound healing, infection, need for multiple I and D's, and pain. Post-Care Instructions: I reviewed with the patient in detail post-care instructions. Patient should keep wound covered and call the office should any redness, pain, swelling or worsening occur.

## 2025-05-13 NOTE — PROGRESS NOTES
ECG 12 Lead    Date/Time: 5/12/2025 10:30 PM  Performed by: Steve Ventura MD    Authorized by: Steve Ventura MD  Comparison: compared with previous ECG from 1/22/2025  Similar to previous ECG  Rhythm: sinus rhythm and sinus bradycardia  Rate: normal  BPM: 58  Conduction: right bundle branch block and left anterior fascicular block  QRS axis: normal    Clinical impression: abnormal EKG

## 2025-07-07 ENCOUNTER — OFFICE VISIT (OUTPATIENT)
Dept: CARDIOLOGY | Facility: CLINIC | Age: 65
End: 2025-07-07
Payer: COMMERCIAL

## 2025-07-07 VITALS
SYSTOLIC BLOOD PRESSURE: 140 MMHG | OXYGEN SATURATION: 99 % | DIASTOLIC BLOOD PRESSURE: 88 MMHG | HEIGHT: 67 IN | BODY MASS INDEX: 38.3 KG/M2 | WEIGHT: 244 LBS | HEART RATE: 92 BPM | RESPIRATION RATE: 18 BRPM

## 2025-07-07 DIAGNOSIS — I25.10 MILD CAD: Primary | ICD-10-CM

## 2025-07-07 DIAGNOSIS — I10 ESSENTIAL HYPERTENSION: ICD-10-CM

## 2025-07-07 DIAGNOSIS — E78.5 HYPERLIPIDEMIA LDL GOAL <70: ICD-10-CM

## 2025-07-07 DIAGNOSIS — I45.2 BIFASCICULAR BLOCK: ICD-10-CM

## 2025-07-07 PROCEDURE — 93000 ELECTROCARDIOGRAM COMPLETE: CPT | Performed by: INTERNAL MEDICINE

## 2025-07-07 PROCEDURE — 99214 OFFICE O/P EST MOD 30 MIN: CPT | Performed by: INTERNAL MEDICINE

## 2025-07-07 RX ORDER — FENOFIBRATE 145 MG/1
145 TABLET, FILM COATED ORAL DAILY
Qty: 90 TABLET | Refills: 3 | Status: SHIPPED | OUTPATIENT
Start: 2025-07-07

## 2025-07-07 NOTE — PROGRESS NOTES
[facility]  [facility] C[facility]IOLOG[facility]  [address] [place] [address]  Dept: [phone]  Dept Fax: [phone]    Abundio Crooks  1960    Follow Up Office Visit Note    History of Present Illness:  Abundio Crooks is a 64 y.o. male who presents to the clinic for Follow-up. Hypertension- BP is better 130.80, on Exforge 5,320, we have advised to lose some weight, otherwise will need a second pill for BP    The following portions of the patient's history were reviewed and updated as appropriate: allergies, current medications, past family history, past medical history, past social history, past surgical history, and problem list.    Medications:  Accu-Chek Grecia Plus strip  Accu-Chek Softclix Lancets  amLODIPine-valsartan  aspirin  atorvastatin  fenofibrate  icosapent ethyl capsule  Jardiance tablet  nortriptyline  omeprazole    Subjective  No Known Allergies     Past Medical History:   Diagnosis Date   • Arthritis    • BMI 34.0-34.9,adult    • COPD (chronic obstructive pulmonary disease)    • Coronary artery disease involving native coronary artery with other form of angina pectoris, unspecified whether native or transplanted heart    • Diabetes mellitus due to underlying condition with hyperglycemia    • Elevated lipids    • Hearing loss    • Heart attack    • Hyperlipidemia    • Hypertension    • Tobacco use        History reviewed. No pertinent surgical history.    Family History   Problem Relation Age of Onset   • Cancer Father         Cancer of Thyroid   • Cancer Sister         Breast Cancer        Social History     Socioeconomic History   • Marital status: Single   Tobacco Use   • Smoking status: Former     Current packs/day: 0.00     Types: Cigarettes     Quit date: 1970     Years since quittin.4   • Smokeless tobacco: Never   Vaping Use   • Vaping status: Never Used   Substance and Sexual Activity   • Alcohol use: Yes   • Drug use: No   • Sexual  "activity: Defer       Review of Systems   Constitutional: Negative.    HENT: Negative.     Respiratory: Negative.     Cardiovascular: Negative.    Endocrine: Negative.    Genitourinary: Negative.    Musculoskeletal: Negative.    Skin: Negative.    Allergic/Immunologic: Negative.    Neurological: Negative.    Hematological: Negative.    Psychiatric/Behavioral: Negative.       Cardiovascular Procedures    ECHO/MUGA:  STRESS TESTS:   CARDIAC CATH:   DEVICES:   HOLTER:   CT/MRI:   VASCULAR:   CARDIOTHORACIC:     Objective  Vitals:    07/07/25 0804   BP: 140/88   BP Location: Right arm   Patient Position: Lying   Cuff Size: Adult   Pulse: 92   Resp: 18   SpO2: 99%   Weight: 111 kg (244 lb)   Height: 170.2 cm (67\")   PainSc: 0-No pain     Body mass index is 38.22 kg/m².     Physical Exam  Vitals reviewed.   Constitutional:       Appearance: Healthy appearance. Not in distress.   Eyes:      Pupils: Pupils are equal, round, and reactive to light.   HENT:    Mouth/Throat:      Pharynx: Oropharynx is clear.   Neck:      Thyroid: Thyroid normal.      Vascular: No JVR. JVD normal.   Pulmonary:      Effort: Pulmonary effort is normal.      Breath sounds: Normal breath sounds. No wheezing. No rhonchi. No rales.   Chest:      Chest wall: Not tender to palpatation.   Cardiovascular:      PMI at left midclavicular line. Normal rate. Regular rhythm. Normal S1. Normal S2.       Murmurs: There is no murmur.      No gallop.  No click. No rub.   Pulses:     Intact distal pulses.      Carotid: 3+ bilaterally.     Radial: 3+ bilaterally.     Femoral: 3+ bilaterally.     Dorsalis pedis: 3+ bilaterally.     Posterior tibial: 3+ bilaterally.  Edema:     Peripheral edema absent.   Abdominal:      General: Bowel sounds are normal.      Palpations: Abdomen is soft.      Tenderness: There is no abdominal tenderness.   Musculoskeletal: Normal range of motion.         General: No tenderness.      Cervical back: Normal range of motion and neck " supple. Skin:     General: Skin is warm and dry.   Neurological:      General: No focal deficit present.      Mental Status: Alert and oriented to person, place and time.        Diagnostic Data    ECG 12 Lead    Date/Time: 7/7/2025 8:31 AM  Performed by: Steve Ventura MD    Authorized by: Steve Ventura MD  Comparison: compared with previous ECG from 5/12/2025  Similar to previous ECG  Rhythm: sinus rhythm  Rate: normal  BPM: 59  Conduction: right bundle branch block and left anterior fascicular block  QRS axis: left    Clinical impression: abnormal EKG        Assessment and Plan  Diagnoses and all orders for this visit:    Mild CAD- No chest pain  -     CBC & Differential  -     Comprehensive Metabolic Panel    Essential hypertension- BP is better 130.80 on Exforge 5,320    Hyperlipidemia LDL goal <70- Will get a Lipid, on Lipitor 40 mg, Vascepa and Fenofibrate  -     Lipid Panel  -     Hemoglobin A1c    Bifascicular block- steady HR is 59    Other orders  -     fenofibrate (TRICOR) 145 MG tablet; Take 1 tablet by mouth Daily.         Return in about 6 months (around 1/7/2026) for Recheck with Dr. Ventura.    Steve Ventura MD  07/07/2025

## 2025-07-08 ENCOUNTER — RESULTS FOLLOW-UP (OUTPATIENT)
Dept: CARDIOLOGY | Facility: CLINIC | Age: 65
End: 2025-07-08
Payer: COMMERCIAL

## 2025-07-08 LAB
ALBUMIN SERPL-MCNC: 4.5 G/DL (ref 3.9–4.9)
ALP SERPL-CCNC: 51 IU/L (ref 44–121)
ALT SERPL-CCNC: 47 IU/L (ref 0–44)
AST SERPL-CCNC: 33 IU/L (ref 0–40)
BASOPHILS # BLD AUTO: 0.1 X10E3/UL (ref 0–0.2)
BASOPHILS NFR BLD AUTO: 1 %
BILIRUB SERPL-MCNC: 0.5 MG/DL (ref 0–1.2)
BUN SERPL-MCNC: 23 MG/DL (ref 8–27)
BUN/CREAT SERPL: 20 (ref 10–24)
CALCIUM SERPL-MCNC: 9.7 MG/DL (ref 8.6–10.2)
CHLORIDE SERPL-SCNC: 101 MMOL/L (ref 96–106)
CHOLEST SERPL-MCNC: 138 MG/DL (ref 100–199)
CO2 SERPL-SCNC: 21 MMOL/L (ref 20–29)
CREAT SERPL-MCNC: 1.13 MG/DL (ref 0.76–1.27)
EGFRCR SERPLBLD CKD-EPI 2021: 73 ML/MIN/1.73
EOSINOPHIL # BLD AUTO: 0.3 X10E3/UL (ref 0–0.4)
EOSINOPHIL NFR BLD AUTO: 3 %
ERYTHROCYTE [DISTWIDTH] IN BLOOD BY AUTOMATED COUNT: 15.7 % (ref 11.6–15.4)
GLOBULIN SER CALC-MCNC: 2.8 G/DL (ref 1.5–4.5)
GLUCOSE SERPL-MCNC: 165 MG/DL (ref 70–99)
HBA1C MFR BLD: 8.1 % (ref 4.8–5.6)
HCT VFR BLD AUTO: 38.9 % (ref 37.5–51)
HDLC SERPL-MCNC: 22 MG/DL
HGB BLD-MCNC: 11.7 G/DL (ref 13–17.7)
IMM GRANULOCYTES # BLD AUTO: 0 X10E3/UL (ref 0–0.1)
IMM GRANULOCYTES NFR BLD AUTO: 0 %
LDLC SERPL CALC-MCNC: 41 MG/DL (ref 0–99)
LYMPHOCYTES # BLD AUTO: 3.4 X10E3/UL (ref 0.7–3.1)
LYMPHOCYTES NFR BLD AUTO: 39 %
MCH RBC QN AUTO: 23.8 PG (ref 26.6–33)
MCHC RBC AUTO-ENTMCNC: 30.1 G/DL (ref 31.5–35.7)
MCV RBC AUTO: 79 FL (ref 79–97)
MONOCYTES # BLD AUTO: 0.6 X10E3/UL (ref 0.1–0.9)
MONOCYTES NFR BLD AUTO: 7 %
NEUTROPHILS # BLD AUTO: 4.4 X10E3/UL (ref 1.4–7)
NEUTROPHILS NFR BLD AUTO: 50 %
PLATELET # BLD AUTO: 324 X10E3/UL (ref 150–450)
POTASSIUM SERPL-SCNC: 4 MMOL/L (ref 3.5–5.2)
PROT SERPL-MCNC: 7.3 G/DL (ref 6–8.5)
RBC # BLD AUTO: 4.91 X10E6/UL (ref 4.14–5.8)
SODIUM SERPL-SCNC: 138 MMOL/L (ref 134–144)
TRIGL SERPL-MCNC: 520 MG/DL (ref 0–149)
VLDLC SERPL CALC-MCNC: 75 MG/DL (ref 5–40)
WBC # BLD AUTO: 8.8 X10E3/UL (ref 3.4–10.8)

## 2025-07-09 NOTE — TELEPHONE ENCOUNTER
Steve Ventura MD to Lili Gleason, RN       7/8/25 12:14 PM  Result Note  Lipids are better but Try still high despite  Fenofibrate Vascepa and Lipitor the Tr are around 500 at some point were over 1000, his A!C is also high he needs to improved the diabetes. Keep same meds eat less carbs sugar  Hemoglobin A1c; Lipid Panel; Comprehensive Metabolic Panel; CBC & Differential